# Patient Record
Sex: MALE | Race: WHITE
[De-identification: names, ages, dates, MRNs, and addresses within clinical notes are randomized per-mention and may not be internally consistent; named-entity substitution may affect disease eponyms.]

---

## 2017-09-15 ENCOUNTER — HOSPITAL ENCOUNTER (EMERGENCY)
Dept: HOSPITAL 17 - PHED | Age: 67
LOS: 1 days | Discharge: HOME | End: 2017-09-16
Payer: MEDICARE

## 2017-09-15 VITALS
RESPIRATION RATE: 18 BRPM | OXYGEN SATURATION: 100 % | SYSTOLIC BLOOD PRESSURE: 144 MMHG | HEART RATE: 83 BPM | DIASTOLIC BLOOD PRESSURE: 97 MMHG

## 2017-09-15 VITALS
SYSTOLIC BLOOD PRESSURE: 137 MMHG | HEART RATE: 91 BPM | DIASTOLIC BLOOD PRESSURE: 90 MMHG | RESPIRATION RATE: 20 BRPM | TEMPERATURE: 97.9 F | OXYGEN SATURATION: 99 %

## 2017-09-15 VITALS — BODY MASS INDEX: 23.41 KG/M2 | HEIGHT: 75 IN | WEIGHT: 188.27 LBS

## 2017-09-15 DIAGNOSIS — Z87.442: ICD-10-CM

## 2017-09-15 DIAGNOSIS — N13.30: ICD-10-CM

## 2017-09-15 DIAGNOSIS — N20.1: Primary | ICD-10-CM

## 2017-09-15 DIAGNOSIS — I45.10: ICD-10-CM

## 2017-09-15 DIAGNOSIS — G20: ICD-10-CM

## 2017-09-15 LAB
ALP SERPL-CCNC: 65 U/L (ref 45–117)
ALT SERPL-CCNC: 6 U/L (ref 12–78)
ANION GAP SERPL CALC-SCNC: 5 MEQ/L (ref 5–15)
AST SERPL-CCNC: 13 U/L (ref 15–37)
BASOPHILS # BLD AUTO: 0 TH/MM3 (ref 0–0.2)
BASOPHILS NFR BLD: 0.4 % (ref 0–2)
BILIRUB SERPL-MCNC: 0.8 MG/DL (ref 0.2–1)
BUN SERPL-MCNC: 30 MG/DL (ref 7–18)
CHLORIDE SERPL-SCNC: 105 MEQ/L (ref 98–107)
EOSINOPHIL # BLD: 0.1 TH/MM3 (ref 0–0.4)
EOSINOPHIL NFR BLD: 0.8 % (ref 0–4)
ERYTHROCYTE [DISTWIDTH] IN BLOOD BY AUTOMATED COUNT: 11.6 % (ref 11.6–17.2)
GFR SERPLBLD BASED ON 1.73 SQ M-ARVRAT: 47 ML/MIN (ref 89–?)
HCO3 BLD-SCNC: 29.5 MEQ/L (ref 21–32)
HCT VFR BLD CALC: 42.4 % (ref 39–51)
HEMO FLAGS: (no result)
LYMPHOCYTES # BLD AUTO: 1.3 TH/MM3 (ref 1–4.8)
LYMPHOCYTES NFR BLD AUTO: 13.9 % (ref 9–44)
MCH RBC QN AUTO: 30.5 PG (ref 27–34)
MCHC RBC AUTO-ENTMCNC: 33.6 % (ref 32–36)
MCV RBC AUTO: 90.8 FL (ref 80–100)
MONOCYTES NFR BLD: 8.3 % (ref 0–8)
NEUTROPHILS # BLD AUTO: 7.3 TH/MM3 (ref 1.8–7.7)
NEUTROPHILS NFR BLD AUTO: 76.6 % (ref 16–70)
PLATELET # BLD: 203 TH/MM3 (ref 150–450)
POTASSIUM SERPL-SCNC: 3.7 MEQ/L (ref 3.5–5.1)
RBC # BLD AUTO: 4.67 MIL/MM3 (ref 4.5–5.9)
SODIUM SERPL-SCNC: 139 MEQ/L (ref 136–145)
WBC # BLD AUTO: 9.5 TH/MM3 (ref 4–11)

## 2017-09-15 PROCEDURE — 80053 COMPREHEN METABOLIC PANEL: CPT

## 2017-09-15 PROCEDURE — 99285 EMERGENCY DEPT VISIT HI MDM: CPT

## 2017-09-15 PROCEDURE — 96374 THER/PROPH/DIAG INJ IV PUSH: CPT

## 2017-09-15 PROCEDURE — 96361 HYDRATE IV INFUSION ADD-ON: CPT

## 2017-09-15 PROCEDURE — 96375 TX/PRO/DX INJ NEW DRUG ADDON: CPT

## 2017-09-15 PROCEDURE — 85025 COMPLETE CBC W/AUTO DIFF WBC: CPT

## 2017-09-15 PROCEDURE — 74176 CT ABD & PELVIS W/O CONTRAST: CPT

## 2017-09-15 NOTE — PD
HPI


Chief Complaint:  Flank/Kidney Pain


Time Seen by Provider:  23:00


Travel History


International Travel<30 days:  No


Contact w/Intl Traveler<30days:  No


Traveled to known affect area:  No





History of Present Illness


HPI


WHILE AT HOME RESTING AND WATCHING TV, DEVELOPED RLQ PAIN, SHARP, NONRAD, 7/10 

ORIG, NOW PAIN FREE,....PREVIOUS H/O LARGE STONE REQUIRING URETERAL STENT AND 

LITHOTRIPSY BY DR JUAN





[facility]


Past Medical History


Blood Disorders:  No


Heart Rhythm Problems:  Yes (RBBB)


Cancer:  No


Cardiovascular Problems:  Yes (RBBB)


Diminished Hearing:  Yes (WEARS HEARING AIDS/[place] BILAT)


Endocrine:  No


Gastrointestinal Disorders:  Yes (HEMORRHOIDS)


Genitourinary:  Yes


Immune Disorder:  Yes (PARKINSON'S)


Implanted Vascular Access Dvce:  No


Kidney Stones:  Yes


Musculoskeletal:  No


Neurologic:  Yes (PARKINSON'S)


Parkinson's Disease:  Yes


Psychiatric:  No


Reproductive:  No


Respiratory:  No


Immunizations Current:  Yes


Influenza Vaccination:  Yes





Past Surgical History


Abdominal Surgery:  Yes (APPY)


Appendectomy:  Yes


Neurologic Surgery:  Yes (STIMULATOR PLACEMENT FOR PARKINSONS)


Tonsillectomy:  Yes


Other Surgery:  Yes (VASECTOMY, HERNIA, HEMORRHOIDECTOMY)





Social History


Alcohol Use:  No


Tobacco Use:  No


Substance Use:  No





Allergies-Medications


(Allergen,Severity, Reaction):  


Coded Allergies:  


     No Known Allergies (Unverified , 9/15/17)


Reported Meds & Prescriptions





Reported Meds & Active Scripts


Active


Ketorolac (Ketorolac Tromethamine) 10 Mg Tab 10 Mg PO Q6HR PRN


Zofran Odt (Ondansetron Odt) 4 Mg Tab 4 Mg SL Q6HR PRN


Percocet (Oxycodone-Acetaminophen)  mg Tab 1 Tab PO Q4H PRN


Flomax (Tamsulosin HCl) 0.4 Mg Cap 0.4 Mg PO HS


Reported


Aleve Arthritis (Naproxen Sodium) 220 Mg Tab 220 Mg PO ONCE


Carbidopa-Levodopa  Mg Tab 1.5 Tab PO Q3HR








Review of Systems


Except as stated in HPI:  all other systems reviewed are Neg


Gastrointestinal:  Positive: Abdominal Pain


Genitourinary:  Positive: Flank Pain





Physical Exam


Narrative


GENERAL: 


SKIN: Warm and dry.


HEAD: Atraumatic. Normocephalic. 


EYES: Pupils equal and round. No scleral icterus. No injection or drainage. 


ENT: No nasal bleeding or discharge.  Mucous membranes pink and moist.


NECK: Trachea midline. No JVD. 


CARDIOVASCULAR: Regular rate and rhythm.  


RESPIRATORY: No accessory muscle use. Clear to auscultation. Breath sounds 

equal bilaterally. 


GASTROINTESTINAL: Abdomen soft, non-tender, nondistended. 


MUSCULOSKELETAL: Extremities without clubbing, cyanosis, or edema. No obvious 

deformities. 


NEUROLOGICAL: Awake and alert. No obvious cranial nerve deficits.  Motor 

grossly within normal limits. Five out of 5 muscle strength in the arms and 

legs.  Normal speech.


PSYCHIATRIC: Appropriate mood and affect; insight and judgment normal.





Data


Data


Last Documented VS





Vital Signs








  Date Time  Temp Pulse Resp B/P (MAP) Pulse Ox O2 Delivery O2 Flow Rate FiO2


 


9/16/17 02:25        


 


9/16/17 01:50  68 18  98 Room Air  


 


9/15/17 21:21 97.9       








Orders





 Orders


Complete Blood Count With Diff (9/15/17 23:03)


Comprehensive Metabolic Panel (9/15/17 23:03)


Ct Abd/Pel W/O Iv Contrast (9/15/17 23:03)


Ecg Monitoring (9/15/17 23:03)


Iv Access Insert/Monitor (9/15/17 23:03)


Sodium Chloride 0.9% Flush (Ns Flush) (9/15/17 23:15)


Sodium Chlor 0.9% 1000 Ml Inj (Ns 1000 M (9/16/17 00:30)


Sodium Chlor 0.9% 1000 Ml Inj (Ns 1000 M (9/16/17 00:30)


Ketorolac Inj (Toradol Inj) (9/16/17 00:30)


Hydromorphone Pf Inj (Dilaudid Pf Inj) (9/16/17 00:30)


Ondansetron Inj (Zofran Inj) (9/16/17 00:30)


Bladder Scan PRN (9/16/17 02:13)





Labs





Laboratory Tests








Test


  9/15/17


23:25


 


White Blood Count 9.5 TH/MM3 


 


Red Blood Count 4.67 MIL/MM3 


 


Hemoglobin 14.3 GM/DL 


 


Hematocrit 42.4 % 


 


Mean Corpuscular Volume 90.8 FL 


 


Mean Corpuscular Hemoglobin 30.5 PG 


 


Mean Corpuscular Hemoglobin


Concent 33.6 % 


 


 


Red Cell Distribution Width 11.6 % 


 


Platelet Count 203 TH/MM3 


 


Mean Platelet Volume 8.1 FL 


 


Neutrophils (%) (Auto) 76.6 % 


 


Lymphocytes (%) (Auto) 13.9 % 


 


Monocytes (%) (Auto) 8.3 % 


 


Eosinophils (%) (Auto) 0.8 % 


 


Basophils (%) (Auto) 0.4 % 


 


Neutrophils # (Auto) 7.3 TH/MM3 


 


Lymphocytes # (Auto) 1.3 TH/MM3 


 


Monocytes # (Auto) 0.8 TH/MM3 


 


Eosinophils # (Auto) 0.1 TH/MM3 


 


Basophils # (Auto) 0.0 TH/MM3 


 


CBC Comment DIFF FINAL 


 


Differential Comment  


 


Blood Urea Nitrogen 30 MG/DL 


 


Creatinine 1.50 MG/DL 


 


Random Glucose 101 MG/DL 


 


Total Protein 7.4 GM/DL 


 


Albumin 4.2 GM/DL 


 


Calcium Level 9.3 MG/DL 


 


Alkaline Phosphatase 65 U/L 


 


Aspartate Amino Transf


(AST/SGOT) 13 U/L 


 


 


Alanine Aminotransferase


(ALT/SGPT) 6 U/L 


 


 


Total Bilirubin 0.8 MG/DL 


 


Sodium Level 139 MEQ/L 


 


Potassium Level 3.7 MEQ/L 


 


Chloride Level 105 MEQ/L 


 


Carbon Dioxide Level 29.5 MEQ/L 


 


Anion Gap 5 MEQ/L 


 


Estimat Glomerular Filtration


Rate 47 ML/MIN 


 











[facility]


Medical Decision Making


Medical Screen Exam Complete:  Yes


Emergency Medical Condition:  Yes


Medical Record Reviewed:  Yes


Differential Diagnosis


PYELO V KIDNEY STONE V DIVERTIC V COLITIS


Narrative Course


patient has no e/o divertic/colitis or pyelo on cat scan, bloodwork non 

significant, and ct did confirm a ureteral stone which is of size that should 

spontaneously pass on their own





Diagnosis





 Primary Impression:  


 URETEROLITHIASIS RIGHT WITH HYDRONEPHROSIS


Referrals:  


[facility]


MAKE AN APPOINTMENT TO FOLLOW UP WITH DR JUAN, YOUR STONE MAY NOT NEED THEIR 

INVOLVEMENT BUT THEY SHOULD DECIDE


Patient Instructions:  General Instructions, Kidney Stones (ED)


Scripts


Ketorolac (Ketorolac) 10 Mg Tab


10 MG PO Q6HR Y for PAIN, #20 TAB 0 Refills


   Prov: Joe Rice MD         9/16/17 


Ondansetron Odt (Zofran Odt) 4 Mg Tab


4 MG SL Q6HR Y for Nausea/Vomiting, #30 TAB 0 Refills


   Prov: Joe Rice MD         9/16/17 


Oxycodone-Acetaminophen (Percocet)  mg Tab


1 TAB PO Q4H Y for PAIN, #28 TAB 0 Refills


   Prov: Joe Rice MD         9/16/17 


Tamsulosin (Flomax) 0.4 Mg Cap


0.4 MG PO HS for Manage Prostate Problems, #10 CAP 0 Refills


   Prov: Joe Rice MD         9/16/17


Disposition:  01 DISCHARGE HOME


Condition:  Stable











Joe Rice MD Sep 15, 2017 23:12

## 2017-09-16 VITALS
SYSTOLIC BLOOD PRESSURE: 145 MMHG | HEART RATE: 68 BPM | RESPIRATION RATE: 18 BRPM | DIASTOLIC BLOOD PRESSURE: 86 MMHG | OXYGEN SATURATION: 98 %

## 2017-09-16 NOTE — RADRPT
EXAM DATE/TIME:  09/15/2017 23:50 

 

[facility] COMPARISON:     

No previous studies available for comparison.

 

 

INDICATIONS :     

Right flank pain. Prior history of renal calculi.

                  

 

ORAL CONTRAST:      

No oral contrast ingested.

                  

 

RADIATION DOSE:     

12.70 CTDIvol (mGy) 

 

 

MEDICAL HISTORY :     

Renal calculi. Parkinsons. 

 

SURGICAL HISTORY :      

Appendectomy. Umbilical hernia repair.Neuro stimulator

 

ENCOUNTER:      

Initial

 

ACUITY:      

1 day

 

PAIN SCALE:      

8/10

 

LOCATION:       

Right flank 

 

TECHNIQUE:     

Volumetric scanning of the abdomen and pelvis was performed.  Using automated exposure control and ad
justment of the mA and/or kV according to patient size, radiation dose was kept as low as reasonably 
achievable to obtain optimal diagnostic quality images.  DICOM format image data is available electro
nically for review and comparison.  

 

FINDINGS:     

 

LOWER LUNGS:     

Lung bases are not visualized on this exam

 

LIVER:     

Visualized portions of the liver show homogeneous density without focal lesion.  There is no dilation
 of the biliary tree.  No calcified gallstones.

 

SPLEEN:     

Visualized portions of intact.

 

PANCREAS:     

Within normal limits. 

 

KIDNEYS:     

Bilateral renal calculi. These are nonobstructing on the left. There may also be an element of medull
fede nephrocalcinosis in the left kidney. The left ureter is normal in caliber throughout its length. 
On the right, however, there is hydronephrosis and hydroureter due to a large, 5 mm stone in the righ
t ureter at the level of the L4 vertebral body. Associated right perinephric stranding.

 

ADRENAL GLANDS:     

Within normal limits.

 

VASCULAR:     

There is no aortic aneurysm.

 

BOWEL/MESENTERY:     

The stomach, small bowel, and colon demonstrate no acute abnormality.  There is no free intraperitone
al air or fluid. Large amount of stool in the ascending and transverse colon.

 

ABDOMINAL WALL:     

Within normal limits.

 

RETROPERITONEUM:     

There is no lymphadenopathy.

 

BLADDER:     

No wall thickening or mass.

 

REPRODUCTIVE:     

Prostate is prominent at 5 cm..

 

INGUINAL:     

There is no lymphadenopathy or hernia.

 

MUSCULOSKELETAL:     

Within normal limits for patient age.

 

CONCLUSION:     

1. Patient's symptoms are due to a 5 mm ureteric stone on the right at the level of the L4 vertebral 
body with resulting hydronephrosis and hydroureter. Associated perinephric stranding.

2. Bilateral renal calculi. These are nonobstructing on the left.

3. Possible element of medullary nephrocalcinosis also involving the left kidney.

4. Prominent prostate.

 

 

 

 Anthony Potter MD on September 16, 2017 at 0:07           

Board Certified Radiologist.

 This report was verified electronically.

## 2017-11-27 ENCOUNTER — HOSPITAL ENCOUNTER (EMERGENCY)
Dept: HOSPITAL 17 - PHED | Age: 67
Discharge: HOME | End: 2017-11-27
Payer: MEDICARE

## 2017-11-27 VITALS
RESPIRATION RATE: 18 BRPM | HEART RATE: 84 BPM | OXYGEN SATURATION: 98 % | SYSTOLIC BLOOD PRESSURE: 144 MMHG | DIASTOLIC BLOOD PRESSURE: 89 MMHG

## 2017-11-27 VITALS — RESPIRATION RATE: 18 BRPM | OXYGEN SATURATION: 98 %

## 2017-11-27 VITALS
RESPIRATION RATE: 18 BRPM | SYSTOLIC BLOOD PRESSURE: 140 MMHG | HEART RATE: 60 BPM | OXYGEN SATURATION: 98 % | DIASTOLIC BLOOD PRESSURE: 78 MMHG

## 2017-11-27 VITALS
DIASTOLIC BLOOD PRESSURE: 86 MMHG | SYSTOLIC BLOOD PRESSURE: 140 MMHG | OXYGEN SATURATION: 99 % | RESPIRATION RATE: 18 BRPM | TEMPERATURE: 97.6 F | HEART RATE: 86 BPM

## 2017-11-27 VITALS — WEIGHT: 187.39 LBS | BODY MASS INDEX: 24.05 KG/M2 | HEIGHT: 74 IN

## 2017-11-27 DIAGNOSIS — Z86.79: ICD-10-CM

## 2017-11-27 DIAGNOSIS — G20: ICD-10-CM

## 2017-11-27 DIAGNOSIS — N20.1: Primary | ICD-10-CM

## 2017-11-27 DIAGNOSIS — Z87.442: ICD-10-CM

## 2017-11-27 DIAGNOSIS — Z87.19: ICD-10-CM

## 2017-11-27 DIAGNOSIS — N40.1: ICD-10-CM

## 2017-11-27 DIAGNOSIS — H91.93: ICD-10-CM

## 2017-11-27 DIAGNOSIS — R35.0: ICD-10-CM

## 2017-11-27 DIAGNOSIS — Z87.448: ICD-10-CM

## 2017-11-27 LAB
ALP SERPL-CCNC: 65 U/L (ref 45–117)
ALT SERPL-CCNC: 10 U/L (ref 12–78)
ANION GAP SERPL CALC-SCNC: 5 MEQ/L (ref 5–15)
AST SERPL-CCNC: 13 U/L (ref 15–37)
BASOPHILS # BLD AUTO: 0 TH/MM3 (ref 0–0.2)
BASOPHILS NFR BLD: 0.1 % (ref 0–2)
BILIRUB SERPL-MCNC: 0.9 MG/DL (ref 0.2–1)
BUN SERPL-MCNC: 29 MG/DL (ref 7–18)
CHLORIDE SERPL-SCNC: 106 MEQ/L (ref 98–107)
COLOR UR: YELLOW
COMMENT (UR): (no result)
CULTURE IF INDICATED: (no result)
EOSINOPHIL # BLD: 0.1 TH/MM3 (ref 0–0.4)
EOSINOPHIL NFR BLD: 1.3 % (ref 0–4)
ERYTHROCYTE [DISTWIDTH] IN BLOOD BY AUTOMATED COUNT: 12 % (ref 11.6–17.2)
GFR SERPLBLD BASED ON 1.73 SQ M-ARVRAT: 55 ML/MIN (ref 89–?)
GLUCOSE UR STRIP-MCNC: (no result) MG/DL
HCO3 BLD-SCNC: 30.6 MEQ/L (ref 21–32)
HCT VFR BLD CALC: 40.9 % (ref 39–51)
HEMO FLAGS: (no result)
HGB UR QL STRIP: (no result)
KETONES UR STRIP-MCNC: (no result) MG/DL
LYMPHOCYTES # BLD AUTO: 1.3 TH/MM3 (ref 1–4.8)
LYMPHOCYTES NFR BLD AUTO: 14.2 % (ref 9–44)
MCH RBC QN AUTO: 30.4 PG (ref 27–34)
MCHC RBC AUTO-ENTMCNC: 33.3 % (ref 32–36)
MCV RBC AUTO: 91.1 FL (ref 80–100)
MONOCYTES NFR BLD: 9.3 % (ref 0–8)
MUCOUS THREADS #/AREA URNS LPF: (no result) /LPF
NEUTROPHILS # BLD AUTO: 6.7 TH/MM3 (ref 1.8–7.7)
NEUTROPHILS NFR BLD AUTO: 75.1 % (ref 16–70)
NITRITE UR QL STRIP: (no result)
PLATELET # BLD: 195 TH/MM3 (ref 150–450)
POTASSIUM SERPL-SCNC: 4.4 MEQ/L (ref 3.5–5.1)
RBC # BLD AUTO: 4.49 MIL/MM3 (ref 4.5–5.9)
SODIUM SERPL-SCNC: 142 MEQ/L (ref 136–145)
SP GR UR STRIP: 1.02 (ref 1–1.03)
SQUAMOUS #/AREA URNS HPF: (no result) /HPF (ref 0–5)
WBC # BLD AUTO: 8.9 TH/MM3 (ref 4–11)

## 2017-11-27 PROCEDURE — 96360 HYDRATION IV INFUSION INIT: CPT

## 2017-11-27 PROCEDURE — 81001 URINALYSIS AUTO W/SCOPE: CPT

## 2017-11-27 PROCEDURE — 87086 URINE CULTURE/COLONY COUNT: CPT

## 2017-11-27 PROCEDURE — 74176 CT ABD & PELVIS W/O CONTRAST: CPT

## 2017-11-27 PROCEDURE — 80053 COMPREHEN METABOLIC PANEL: CPT

## 2017-11-27 PROCEDURE — 99285 EMERGENCY DEPT VISIT HI MDM: CPT

## 2017-11-27 PROCEDURE — 85025 COMPLETE CBC W/AUTO DIFF WBC: CPT

## 2017-11-27 NOTE — RADRPT
EXAM DATE/TIME:  11/27/2017 16:31 

 

[facility] COMPARISON:     

CT ABDOMEN & PELVIS W/O CONTRAST, September 15, 2017, 23:50.

 

 

INDICATIONS :     

Right abdominal pain radiaitng to the left side.

                  

 

ORAL CONTRAST:      

No oral contrast ingested.

                  

 

RADIATION DOSE:     

13.29 CTDIvol (mGy) 

 

 

MEDICAL HISTORY :     

Parkinson's.  

 

SURGICAL HISTORY :      

Appendectomy. Stimulator for Parkinson's. ureter stent.

 

ENCOUNTER:      

Initial

 

ACUITY:      

2 days

 

PAIN SCALE:      

6/10

 

LOCATION:       

Bilateral  abdomen

 

TECHNIQUE:     

Volumetric scanning of the abdomen and pelvis was performed.  Using automated exposure control and ad
justment of the mA and/or kV according to patient size, radiation dose was kept as low as reasonably 
achievable to obtain optimal diagnostic quality images.  DICOM format image data is available electro
nically for review and comparison.  

 

FINDINGS:     

The lung base is are clear.

There is no pericardial effusion

The liver and gallbladder are unremarkable

The spleen, pancreas and adrenals unremarkable

 

Right kidney: 

There are 2 mm stone midportion right kidney with triangle shaped 6 mm stone middle third right urete
r with moderate hydronephrosis.

 

Left kidney: 

3 mm calcification midportion left kidney without obstruction

3 mm stone is present in the bladder.  Prostate is large measuring 5 cm.

 

CONCLUSION:     Obstructing 6 mm stone middle third right ureter with  moderate hydronephrosis.

This is unchanged from 9/15/17

Prominent prostate with 3 mm stone in the bladder.  

 

 

 Cristóbal Hendricks MD FACR on November 27, 2017 at 16:45           

Board Certified Radiologist.

 This report was verified electronically.

## 2017-11-27 NOTE — PD
HPI


Chief Complaint:  Abdominal Pain


Time Seen by Provider:  15:55


Travel History


International Travel<30 days:  No


Contact w/Intl Traveler<30days:  No


Traveled to known affect area:  No





History of Present Illness


HPI


67-year-old male came to the emergency room with history of vague bandlike mid 

abdominal pain going from left-to-right for past 3 days.  His wife is here who 

brought him in.  Patient has history of constipation but he also has history of 

renal calculus.  His wife said that in past 2 months he has had 2 stones.  Also 

he needs MiraLAX every day and Dulcolax suppository every second day to have a 

bowel movement.  Patient has history of Parkinson's.  No history of vomiting.  

He says his appetite has been great.  No history of fever or chills.  Vital 

signs were relatively stable.  No aggravating or relieving factors identified 

for the pain.  The pain seems colicky nature and comes and goes.





[facility]


Past Medical History


*** Narrative Medical


List of his past medical, surgical, social and family history is reviewed from 

the nursing note.


Blood Disorders:  No


Heart Rhythm Problems:  Yes (RBBB)


Cancer:  No


Cardiovascular Problems:  Yes (RBBB)


Diminished Hearing:  Yes (WEARS HEARING AIDS/[place] BILAT)


Endocrine:  No


Gastrointestinal Disorders:  Yes (HEMORRHOIDS)


Genitourinary:  Yes


Immune Disorder:  Yes (PARKINSON'S)


Implanted Vascular Access Dvce:  No


Kidney Stones:  Yes


Musculoskeletal:  No


Neurologic:  Yes (PARKINSON'S)


Parkinson's Disease:  Yes


Psychiatric:  No


Reproductive:  No


Respiratory:  No


Immunizations Current:  Yes


Influenza Vaccination:  Yes





Past Surgical History


Abdominal Surgery:  Yes (APPY)


Appendectomy:  Yes


Genitourinary Surgery:  Yes (URETER STENT, LITHOTRIPSY X4)


Neurologic Surgery:  Yes (STIMULATOR PLACEMENT FOR PARKINSONS)


Tonsillectomy:  Yes


Other Surgery:  Yes (VASECTOMY, HERNIA, HEMORRHOIDECTOMY)





Social History


Alcohol Use:  No


Tobacco Use:  No


Substance Use:  No





Allergies-Medications


(Allergen,Severity, Reaction):  


Coded Allergies:  


     No Known Allergies (Unverified  Adverse Reaction, Unknown, 11/27/17)


Comments


No known drug allergies


Reported Meds & Prescriptions





Reported Meds & Active Scripts


Active


Macrobid (Nitrofurantoin Monoh/Nitrofur Macro) 100 Mg Cap 100 Mg PO BID 10 Days


Reported


Dulcolax Supp (Bisacodyl) 10 Mg Supp 10 Mg RECTAL EVERY OTHER DAY PRN


Miralax Powder (Polyethylene Glycol 3350 Powder) 17 Gm Powd 17 Gm PO DAILY


     Mix and dissolve one measuring cap-ful (17 grams) in water or juice.


Carbidopa-Levodopa  Mg Tab 1.5 Tab PO Q3HR





Narrative Medication


List of his home medications reviewed from the nursing note.





Review of Systems


Except as stated in HPI:  all other systems reviewed are Neg


Gastrointestinal:  Positive: Abdominal Pain





Physical Exam


Narrative


GENERAL: Awake, alert, mild distress, parkinsonian symptoms


SKIN: Focused skin assessment warm/dry.


HEAD: Atraumatic. Normocephalic. 


EYES: Pupils equal and round. No scleral icterus. No injection or drainage. 


ENT: No nasal bleeding or discharge.  Mucous membranes pink and moist.


NECK: Trachea midline. No JVD. 


CARDIOVASCULAR: Regular rate and rhythm.  No murmur appreciated.


RESPIRATORY: No accessory muscle use. Clear to auscultation. Breath sounds 

equal bilaterally. 


GASTROINTESTINAL: Abdomen soft, non-tender, nondistended. Hepatic and splenic 

margins not palpable. 


MUSCULOSKELETAL: No obvious deformities. No clubbing.  No cyanosis.  No edema. 


NEUROLOGICAL: Awake and alert. No obvious cranial nerve deficits.  Motor 

grossly within normal limits. Normal speech.


PSYCHIATRIC: Appropriate mood and affect; insight and judgment normal.





Data


Data


Last Documented VS





Orders





 Orders


Complete Blood Count With Diff (11/27/17 16:02)


Comprehensive Metabolic Panel (11/27/17 16:02)


Urinalysis - C+S If Indicated (11/27/17 16:02)


Ct Abd/Pel W/O Iv Contrast (11/27/17 16:02)


Iv Access Insert/Monitor (11/27/17 16:02)


Ecg Monitoring (11/27/17 16:02)


Oximetry (11/27/17 16:02)


Sodium Chlor 0.9% 1000 Ml Inj (Ns 1000 M (11/27/17 16:02)


Sodium Chloride 0.9% Flush (Ns Flush) (11/27/17 16:15)


Sodium Chlor 0.9% 1000 Ml Inj (Ns 1000 M (11/27/17 17:00)


Urine Culture (11/27/17 16:20)


Nitrofurantoin Monohyd Macrocr (Macrobid (11/27/17 17:15)


Ed Discharge Order (11/27/17 17:20)


Radiology Film Requests (11/27/17 )





Labs





Laboratory Tests








Test


  11/27/17


16:00 11/27/17


16:20


 


White Blood Count 8.9 TH/MM3  


 


Red Blood Count 4.49 MIL/MM3  


 


Hemoglobin 13.6 GM/DL  


 


Hematocrit 40.9 %  


 


Mean Corpuscular Volume 91.1 FL  


 


Mean Corpuscular Hemoglobin 30.4 PG  


 


Mean Corpuscular Hemoglobin


Concent 33.3 % 


  


 


 


Red Cell Distribution Width 12.0 %  


 


Platelet Count 195 TH/MM3  


 


Mean Platelet Volume 8.5 FL  


 


Neutrophils (%) (Auto) 75.1 %  


 


Lymphocytes (%) (Auto) 14.2 %  


 


Monocytes (%) (Auto) 9.3 %  


 


Eosinophils (%) (Auto) 1.3 %  


 


Basophils (%) (Auto) 0.1 %  


 


Neutrophils # (Auto) 6.7 TH/MM3  


 


Lymphocytes # (Auto) 1.3 TH/MM3  


 


Monocytes # (Auto) 0.8 TH/MM3  


 


Eosinophils # (Auto) 0.1 TH/MM3  


 


Basophils # (Auto) 0.0 TH/MM3  


 


CBC Comment DIFF FINAL  


 


Differential Comment   


 


Blood Urea Nitrogen 29 MG/DL  


 


Creatinine 1.30 MG/DL  


 


Random Glucose 95 MG/DL  


 


Total Protein 7.4 GM/DL  


 


Albumin 4.1 GM/DL  


 


Calcium Level 9.0 MG/DL  


 


Alkaline Phosphatase 65 U/L  


 


Aspartate Amino Transf


(AST/SGOT) 13 U/L 


  


 


 


Alanine Aminotransferase


(ALT/SGPT) 10 U/L 


  


 


 


Total Bilirubin 0.9 MG/DL  


 


Sodium Level 142 MEQ/L  


 


Potassium Level 4.4 MEQ/L  


 


Chloride Level 106 MEQ/L  


 


Carbon Dioxide Level 30.6 MEQ/L  


 


Anion Gap 5 MEQ/L  


 


Estimat Glomerular Filtration


Rate 55 ML/MIN 


  


 


 


Urine Color  YELLOW 


 


Urine Turbidity  CLEAR 


 


Urine pH  5.0 


 


Urine Specific Gravity  1.021 


 


Urine Protein  TRACE mg/dL 


 


Urine Glucose (UA)  NEG mg/dL 


 


Urine Ketones  TRACE mg/dL 


 


Urine Occult Blood  MOD 


 


Urine Nitrite  NEG 


 


Urine Bilirubin  NEG 


 


Urine Leukocyte Esterase  NEG 


 


Urine RBC  20-24 /hpf 


 


Urine WBC  9-14 /hpf 


 


Urine Squamous Epithelial


Cells 


  0-5 /hpf 


 


 


Urine Mucus  MANY /lpf 


 


Microscopic Urinalysis Comment


  


  CULTURE


INDICATED











MDM


Medical Decision Making


Medical Screen Exam Complete:  Yes


Emergency Medical Condition:  Yes


Medical Record Reviewed:  Yes


Differential Diagnosis


Acute gastritis, acute cholecystitis, renal colic, abdominal pain NOS


Narrative Course


5:13 PM blood test results of back and within acceptable limits.  UA shows some 

dehydration.  CAT scan shows an unchanged 6 mm midureteral right ureteral stone 

with moderate hydronephrosis.  As per the radiologist this has not changed his 

physicians since September.  Patient was medicated for pain.  His UA suggestive 

of the hematuria and UTI.  I have ordered a dose of Macrobid.  I discussed with 

his wife regarding the CAT scan report and she told me that his urologist Dr. Richard tried to blast the stone few days ago.  I have told her that I would 

give her the CD of the CAT scan so that she can take it with her to the 

urologist and should she see the urologist as soon as possible.  She 

understands.  At this point I'm comfortable discharging the patient.





Procedures


EKG Prior to Arrival:  No





Diagnosis





 Primary Impression:  


 Ureteral stone


 Additional Impression:  


 BPH (benign prostatic hyperplasia)


 Qualified Codes:  N40.1 - Benign prostatic hyperplasia with lower urinary 

tract symptoms; R35.0 - Frequency of micturition


Referrals:  


Primary Care Physician





***Additional Instructions:  


Please follow-up with his urologist as soon as possible.  Call tomorrow the 

office to make an appointment.  Take the CD of the CAT scan that was done here 

with you when you go to see the urologist.  Return to the ER if the condition 

worsens or any other new concerns.  Take the antibiotic as per the prescription 

direction.


***Med/Other Pt SpecificInfo:  Prescription(s) given


Scripts


Nitrofurantoin Monohydrate Macrocrystals (Macrobid) 100 Mg Cap


100 MG PO BID for Infection for 10 Days, #20 CAP 0 Refills


   Prov: Sharita Zabala MD         11/27/17


Disposition:  01 DISCHARGE HOME


Condition:  Stable











Sharita Zabala MD Nov 27, 2017 16:54

## 2018-08-13 ENCOUNTER — APPOINTMENT (RX ONLY)
Dept: URBAN - METROPOLITAN AREA CLINIC 52 | Facility: CLINIC | Age: 68
Setting detail: DERMATOLOGY
End: 2018-08-13

## 2018-08-13 DIAGNOSIS — D485 NEOPLASM OF UNCERTAIN BEHAVIOR OF SKIN: ICD-10-CM

## 2018-08-13 DIAGNOSIS — L82.0 INFLAMED SEBORRHEIC KERATOSIS: ICD-10-CM

## 2018-08-13 DIAGNOSIS — D18.0 HEMANGIOMA: ICD-10-CM

## 2018-08-13 DIAGNOSIS — Z85.828 PERSONAL HISTORY OF OTHER MALIGNANT NEOPLASM OF SKIN: ICD-10-CM

## 2018-08-13 DIAGNOSIS — L85.3 XEROSIS CUTIS: ICD-10-CM

## 2018-08-13 DIAGNOSIS — L82.1 OTHER SEBORRHEIC KERATOSIS: ICD-10-CM

## 2018-08-13 DIAGNOSIS — L81.4 OTHER MELANIN HYPERPIGMENTATION: ICD-10-CM

## 2018-08-13 DIAGNOSIS — L30.0 NUMMULAR DERMATITIS: ICD-10-CM

## 2018-08-13 DIAGNOSIS — L57.8 OTHER SKIN CHANGES DUE TO CHRONIC EXPOSURE TO NONIONIZING RADIATION: ICD-10-CM

## 2018-08-13 DIAGNOSIS — D22 MELANOCYTIC NEVI: ICD-10-CM

## 2018-08-13 PROBLEM — D18.01 HEMANGIOMA OF SKIN AND SUBCUTANEOUS TISSUE: Status: ACTIVE | Noted: 2018-08-13

## 2018-08-13 PROBLEM — L57.0 ACTINIC KERATOSIS: Status: ACTIVE | Noted: 2018-08-13

## 2018-08-13 PROBLEM — D22.9 MELANOCYTIC NEVI, UNSPECIFIED: Status: ACTIVE | Noted: 2018-08-13

## 2018-08-13 PROBLEM — D48.5 NEOPLASM OF UNCERTAIN BEHAVIOR OF SKIN: Status: ACTIVE | Noted: 2018-08-13

## 2018-08-13 PROCEDURE — 11100: CPT | Mod: 59

## 2018-08-13 PROCEDURE — ? PRESCRIPTION

## 2018-08-13 PROCEDURE — ? BIOPSY BY SHAVE METHOD

## 2018-08-13 PROCEDURE — 17110 DESTRUCTION B9 LES UP TO 14: CPT

## 2018-08-13 PROCEDURE — 99214 OFFICE O/P EST MOD 30 MIN: CPT | Mod: 25

## 2018-08-13 PROCEDURE — ? LIQUID NITROGEN

## 2018-08-13 PROCEDURE — ? COUNSELING

## 2018-08-13 RX ORDER — TRIAMCINOLONE ACETONIDE 1 MG/G
CREAM TOPICAL BID
Qty: 1 | Refills: 1 | Status: ERX | COMMUNITY
Start: 2018-08-13

## 2018-08-13 RX ADMIN — TRIAMCINOLONE ACETONIDE: 1 CREAM TOPICAL at 13:42

## 2018-08-13 ASSESSMENT — LOCATION DETAILED DESCRIPTION DERM
LOCATION DETAILED: RIGHT DISTAL MEDIAL POSTERIOR THIGH
LOCATION DETAILED: RIGHT MID-UPPER BACK
LOCATION DETAILED: PERIUMBILICAL SKIN
LOCATION DETAILED: XIPHOID
LOCATION DETAILED: LEFT ANTERIOR PROXIMAL UPPER ARM
LOCATION DETAILED: RIGHT DISTAL ULNAR THUMB
LOCATION DETAILED: RIGHT ANTERIOR PROXIMAL THIGH

## 2018-08-13 ASSESSMENT — LOCATION ZONE DERM
LOCATION ZONE: TRUNK
LOCATION ZONE: FINGER
LOCATION ZONE: ARM
LOCATION ZONE: LEG

## 2018-08-13 ASSESSMENT — LOCATION SIMPLE DESCRIPTION DERM
LOCATION SIMPLE: RIGHT POSTERIOR THIGH
LOCATION SIMPLE: ABDOMEN
LOCATION SIMPLE: RIGHT THIGH
LOCATION SIMPLE: LEFT UPPER ARM
LOCATION SIMPLE: RIGHT UPPER BACK
LOCATION SIMPLE: RIGHT THUMB

## 2018-08-13 NOTE — PROCEDURE: LIQUID NITROGEN
Detail Level: Detailed
Post-Care Instructions: I reviewed with the patient in detail post-care instructions. Patient is to wear sunprotection, and avoid picking at any of the treated lesions. Pt may apply Vaseline to crusted or scabbing areas.
Consent: The patient's consent was obtained including but not limited to risks of crusting, scabbing, blistering, scarring, darker or lighter pigmentary change, recurrence, incomplete removal and infection.
Medical Necessity Information: It is in your best interest to select a reason for this procedure from the list below. All of these items fulfill various CMS LCD requirements except the new and changing color options.
Medical Necessity Clause: This procedure was medically necessary because the lesions that were treated were:
Render Post-Care Instructions In Note?: no
Include Z78.9 (Other Specified Conditions Influencing Health Status) As An Associated Diagnosis?: Yes

## 2018-08-13 NOTE — PROCEDURE: BIOPSY BY SHAVE METHOD
Cryotherapy Text: The wound bed was treated with cryotherapy after the biopsy was performed.
Silver Nitrate Text: The wound bed was treated with silver nitrate after the biopsy was performed.
Post-Care Instructions: I reviewed with the patient in detail post-care instructions. Patient to keep bandaid on for 24 hours. Then remove, wash with soap and water and apply vaseline twice daily until healed.
Electrodesiccation Text: The wound bed was treated with electrodesiccation after the biopsy was performed.
X Size Of Lesion In Cm: 0.7
Anesthesia Type: 1% lidocaine with epinephrine
Curettage Text: The wound bed was treated with curettage after the biopsy was performed.
Billing Type: Third-Party Bill
Type Of Destruction Used: Curettage
Additional Anesthesia Volume In Cc (Will Not Render If 0): 0
Biopsy Method: Personna blade
Lab Facility: [id number]
Anesthesia Volume In Cc (Will Not Render If 0): 0.5
Notification Instructions: Patient will be notified of biopsy results. However, patient instructed to call the office if not contacted within 2 weeks.
Wound Care: Petrolatum
Bill 14748 For Specimen Handling/Conveyance To Laboratory?: no
Biopsy Type: H and E
Hemostasis: Aline's
Depth Of Biopsy: dermis
Electrodesiccation And Curettage Text: The wound bed was treated with electrodesiccation and curettage after the biopsy was performed.
Consent: Written consent was obtained and risks were reviewed including but not limited to scarring, infection, bleeding, scabbing, incomplete removal, nerve damage and allergy to anesthesia.
Was A Bandage Applied: Yes
Dressing: bandage
Size Of Lesion In Cm: 0.8
Detail Level: Detailed
Lab: -7

## 2018-09-27 ENCOUNTER — APPOINTMENT (RX ONLY)
Dept: URBAN - METROPOLITAN AREA CLINIC 52 | Facility: CLINIC | Age: 68
Setting detail: DERMATOLOGY
End: 2018-09-27

## 2018-09-27 DIAGNOSIS — D485 NEOPLASM OF UNCERTAIN BEHAVIOR OF SKIN: ICD-10-CM

## 2018-09-27 DIAGNOSIS — L57.0 ACTINIC KERATOSIS: ICD-10-CM | Status: RESOLVED

## 2018-09-27 PROBLEM — D48.5 NEOPLASM OF UNCERTAIN BEHAVIOR OF SKIN: Status: ACTIVE | Noted: 2018-09-27

## 2018-09-27 PROCEDURE — ? COUNSELING

## 2018-09-27 PROCEDURE — 99213 OFFICE O/P EST LOW 20 MIN: CPT | Mod: 25

## 2018-09-27 PROCEDURE — 11100: CPT

## 2018-09-27 PROCEDURE — ? OBSERVATION

## 2018-09-27 PROCEDURE — ? BIOPSY BY SHAVE METHOD

## 2018-09-27 ASSESSMENT — LOCATION DETAILED DESCRIPTION DERM
LOCATION DETAILED: RIGHT POPLITEAL SKIN
LOCATION DETAILED: RIGHT POPLITEAL SKIN
LOCATION DETAILED: RIGHT DISTAL RADIAL THUMB

## 2018-09-27 ASSESSMENT — LOCATION ZONE DERM
LOCATION ZONE: LEG
LOCATION ZONE: FINGER
LOCATION ZONE: LEG

## 2018-09-27 ASSESSMENT — LOCATION SIMPLE DESCRIPTION DERM
LOCATION SIMPLE: RIGHT THUMB
LOCATION SIMPLE: RIGHT POPLITEAL SKIN
LOCATION SIMPLE: RIGHT POPLITEAL SKIN

## 2018-09-27 NOTE — PROCEDURE: OBSERVATION
Body Location Override (Optional - Billing Will Still Be Based On Selected Body Map Location If Applicable): right distal medial posterior thigh
Size Of Lesion In Cm (Optional): 0
Detail Level: Zone